# Patient Record
Sex: FEMALE | Race: OTHER | URBAN - METROPOLITAN AREA
[De-identification: names, ages, dates, MRNs, and addresses within clinical notes are randomized per-mention and may not be internally consistent; named-entity substitution may affect disease eponyms.]

---

## 2022-11-28 LAB
AMB EXT CHLAMYDIA, NUCLEIC ACID AMP: NEGATIVE
AMB EXT GONOCOCCUS, NUCLEIC ACID AMP: NEGATIVE
AMB EXT HPV: NEGATIVE
AMB EXT THINPREP PAP: NEGATIVE

## 2022-12-23 LAB
AMB EXT ANTIBODY SCREEN: NEGATIVE
AMB EXT GLUCOSE 1HR OB: 179
AMB EXT HBSAG SCREEN: NEGATIVE
AMB EXT HEMATOCRIT: 40.4
AMB EXT HEMOGLOBIN: 13.3
AMB EXT MCV: 84
AMB EXT PLATELETS: 210
AMB EXT RH FACTOR: POSITIVE
AMB EXT SICKLE CELL SOLUBILITY: NEGATIVE
AMB EXT TSH: 0.64 MIU/ML

## 2022-12-27 LAB
AMB EXT 1 HR GLUCOSE GESTATIONAL: 140
AMB EXT 2 HR GLUCOSE GESTATIONAL: 117
AMB EXT 3 HR GLUCOSE GESTATIONAL: 102
AMB EXT FASTING GLUCOSE GESTATIONAL: 80
AMB EXT FETAL ANEUPLOIDY TRISOMY RISK: NEGATIVE

## 2023-01-24 LAB — AMB EXT AFP TUMOR MARKER: NEGATIVE

## 2023-02-10 ENCOUNTER — TELEPHONE (OUTPATIENT)
Dept: OBGYN CLINIC | Facility: CLINIC | Age: 38
End: 2023-02-10

## 2023-02-10 NOTE — TELEPHONE ENCOUNTER
Need to confirm how far along pt is. If he is less than 20 weeks pt can be scheduled with any of the providers.      LMTCB

## 2023-02-10 NOTE — TELEPHONE ENCOUNTER
Patient is almost 18 weeks along and would like to transfer her care to this office. Records are being faxed. Please call once reviewed.

## 2023-02-10 NOTE — TELEPHONE ENCOUNTER
Pt Name and  verified. Pt is 18 wks. Has requested her records. Advised to try and obtain and bring with for day of apt. Pt scheduled with AYAD.

## 2023-02-13 ENCOUNTER — OFFICE VISIT (OUTPATIENT)
Dept: OBGYN CLINIC | Facility: CLINIC | Age: 38
End: 2023-02-13

## 2023-02-13 ENCOUNTER — TELEPHONE (OUTPATIENT)
Dept: PERINATAL CARE | Facility: HOSPITAL | Age: 38
End: 2023-02-13

## 2023-02-13 VITALS — WEIGHT: 156.63 LBS | DIASTOLIC BLOOD PRESSURE: 64 MMHG | SYSTOLIC BLOOD PRESSURE: 102 MMHG

## 2023-02-13 DIAGNOSIS — O09.522 MULTIGRAVIDA OF ADVANCED MATERNAL AGE IN SECOND TRIMESTER: ICD-10-CM

## 2023-02-13 DIAGNOSIS — N91.2 AMENORRHEA: ICD-10-CM

## 2023-02-13 DIAGNOSIS — N92.6 MISSED MENSES: Primary | ICD-10-CM

## 2023-02-13 LAB
CONTROL LINE PRESENT WITH A CLEAR BACKGROUND (YES/NO): YES YES/NO
PREGNANCY TEST, URINE: POSITIVE

## 2023-02-13 RX ORDER — METOCLOPRAMIDE 5 MG/1
5 TABLET ORAL 4 TIMES DAILY
COMMUNITY
Start: 2022-12-02 | End: 2023-02-13

## 2023-02-13 RX ORDER — MULTIVIT,IRON,MINERALS/LUTEIN
TABLET ORAL
COMMUNITY

## 2023-02-13 RX ORDER — METOCLOPRAMIDE 5 MG/1
5 TABLET ORAL
COMMUNITY
Start: 2022-11-28 | End: 2023-02-26

## 2023-02-13 RX ORDER — ASPIRIN 81 MG/1
81 TABLET, CHEWABLE ORAL DAILY
COMMUNITY
Start: 2022-12-27 | End: 2023-06-25

## 2023-02-13 NOTE — TELEPHONE ENCOUNTER
Returned PTs call to schedule w/MFM. Called her using the  line and left a voicemail to call us back.

## 2023-02-14 ENCOUNTER — TELEPHONE (OUTPATIENT)
Dept: OBGYN CLINIC | Facility: CLINIC | Age: 38
End: 2023-02-14

## 2023-02-14 ENCOUNTER — TELEPHONE (OUTPATIENT)
Dept: PERINATAL CARE | Facility: HOSPITAL | Age: 38
End: 2023-02-14

## 2023-02-14 NOTE — TELEPHONE ENCOUNTER
Dr. Miller Hope told Pt to schedule ultrasound as soon as possible. The soonest appointment was not until 3/14/23. Will that be okay? Needs . Please call.

## 2023-02-15 NOTE — TELEPHONE ENCOUNTER
Patient name and  verified. Advised patient to keep scheduled appt. Encouraged to contact [facility] office for possible cancellations. Verbalized understanding.

## 2023-02-16 NOTE — TELEPHONE ENCOUNTER
Your case has been Approved. Provider Name: DR. Justino Gonzalez Contact: [facility]  Provider Address: [address]  [place], [place] Phone Number: [phone]   Fax Number: [phone]  Patient Name: Susanna Taylor Patient Id: [id number]  Insurance Carrier: [facility]  Site Name: Sean Dyer Site ID: [id number]  Site Address: [address] [place] [address]  [place], [place]      Primary Diagnosis Code: O09.522 Description: Supervision of elderly multigravida, second trimester  Secondary Diagnosis Code:  Description:   Date of Service: Not provided    CPT Code: 40478 Description: OB US, DETAILED, [facility] Number: [id number]  Review Date: 2/16/2023 4:50:00 PM  Expiration Date: 11/13/2023  Status: Your case has been Approved.

## 2023-02-28 ENCOUNTER — NURSE ONLY (OUTPATIENT)
Dept: OBGYN CLINIC | Facility: CLINIC | Age: 38
End: 2023-02-28

## 2023-02-28 ENCOUNTER — TELEPHONE (OUTPATIENT)
Dept: CASE MANAGEMENT | Facility: HOSPITAL | Age: 38
End: 2023-02-28

## 2023-02-28 VITALS — BODY MASS INDEX: 24 KG/M2 | HEIGHT: 68 IN

## 2023-02-28 PROCEDURE — 99211 OFF/OP EST MAY X REQ PHY/QHP: CPT | Performed by: OBSTETRICS & GYNECOLOGY

## 2023-02-28 NOTE — PROGRESS NOTES
Patient seen in office today for Ob education visit. Missed menses apt with: AYAD  LMP: 10/3/2022    Pre  BMI: 24.33  EPDS score: 3/30  +UPT at home:  10/29/22  +UPT in office: N/a pt is a FABIANA    US: [facility] at [facility]  Working LETTY: 7/10/23 by LMP  Hx of genetic abnormality in family: Denies  Hx of varicella: had chicken pox  Flu Vaccine: not received   Covid Vaccine: Covid vaccine series completed      Consent (if needed): n/a      Sterilization/Contraception: desires BC but unsure of method     OUD Screening: Pt. Has answered NO 5P questions and has NO  risk factors. Pt. Given What pregnant women need to know handout. Educational material reviewed with patient: Prenatal care, nutrition, weight gain recommendations, travel, exercise, intercourse, pregnancy changes, safe medications, pregnancy and work, fetal movement, labor and  labor, warning signs, food safety, tdap, cord blood, breastfeeding-breast, circumcision-having a girl, and Group B strep. Blood transfusion if needed: yes  PN labs: see results console     Optional genetic screening labs were reviewed: Chirag Crespo, FTS with US, Quad screen MSAFP and CF screening. Had Uawnaqin64 genetic testing and was normal. Having a girl.   CF screen-normal  SMN-normal     [employer] Policy: informed     NOB apt: scheduled 3/10/2023 with AYAD

## 2023-03-10 ENCOUNTER — INITIAL PRENATAL (OUTPATIENT)
Dept: OBGYN CLINIC | Facility: CLINIC | Age: 38
End: 2023-03-10

## 2023-03-10 VITALS — WEIGHT: 160 LBS | SYSTOLIC BLOOD PRESSURE: 105 MMHG | BODY MASS INDEX: 24 KG/M2 | DIASTOLIC BLOOD PRESSURE: 64 MMHG

## 2023-03-10 DIAGNOSIS — Z34.82 ENCOUNTER FOR SUPERVISION OF OTHER NORMAL PREGNANCY IN SECOND TRIMESTER: Primary | ICD-10-CM

## 2023-03-10 DIAGNOSIS — O09.522 AMA (ADVANCED MATERNAL AGE) MULTIGRAVIDA 35+, SECOND TRIMESTER: ICD-10-CM

## 2023-03-10 PROCEDURE — 3078F DIAST BP <80 MM HG: CPT | Performed by: OBSTETRICS & GYNECOLOGY

## 2023-03-10 PROCEDURE — 3074F SYST BP LT 130 MM HG: CPT | Performed by: OBSTETRICS & GYNECOLOGY

## 2023-03-10 PROCEDURE — 0500F INITIAL PRENATAL CARE VISIT: CPT | Performed by: OBSTETRICS & GYNECOLOGY

## 2023-03-14 ENCOUNTER — HOSPITAL ENCOUNTER (OUTPATIENT)
Dept: PERINATAL CARE | Facility: HOSPITAL | Age: 38
Discharge: HOME | End: 2023-03-14
Attending: OBSTETRICS & GYNECOLOGY
Payer: MEDICAID

## 2023-03-14 ENCOUNTER — TELEPHONE (OUTPATIENT)
Dept: PERINATAL CARE | Facility: HOSPITAL | Age: 38
End: 2023-03-14

## 2023-03-14 DIAGNOSIS — Z36.3 ENCOUNTER FOR ANTENATAL SCREENING FOR MALFORMATION USING ULTRASOUND: Primary | ICD-10-CM

## 2023-03-14 DIAGNOSIS — O09.512 PRIMIGRAVIDA OF ADVANCED MATERNAL AGE IN SECOND TRIMESTER: ICD-10-CM

## 2023-03-14 DIAGNOSIS — Z36.3 ENCOUNTER FOR ANTENATAL SCREENING FOR MALFORMATION USING ULTRASOUND: ICD-10-CM

## 2023-03-14 PROBLEM — O09.519 AMA (ADVANCED MATERNAL AGE) PRIMIGRAVIDA 35+: Status: ACTIVE | Noted: 2023-03-14

## 2023-03-14 PROCEDURE — 76811 OB US DETAILED SNGL FETUS: CPT | Performed by: OBSTETRICS & GYNECOLOGY

## 2023-03-15 NOTE — PROGRESS NOTES
Good fm. Pregnancy counseling and all questions answered. AMA. Counseled. Has mfm appt 3/14/23 for DUSTY.

## 2023-04-04 ENCOUNTER — ROUTINE PRENATAL (OUTPATIENT)
Dept: OBGYN CLINIC | Facility: CLINIC | Age: 38
End: 2023-04-04

## 2023-04-04 VITALS — WEIGHT: 167 LBS | DIASTOLIC BLOOD PRESSURE: 63 MMHG | BODY MASS INDEX: 25 KG/M2 | SYSTOLIC BLOOD PRESSURE: 102 MMHG

## 2023-04-04 DIAGNOSIS — Z34.82 ENCOUNTER FOR SUPERVISION OF OTHER NORMAL PREGNANCY IN SECOND TRIMESTER: Primary | ICD-10-CM

## 2023-04-04 LAB
APPEARANCE: CLEAR
BILIRUBIN: NEGATIVE
GLUCOSE (URINE DIPSTICK): NEGATIVE MG/DL
KETONES (URINE DIPSTICK): NEGATIVE MG/DL
MULTISTIX LOT#: ABNORMAL NUMERIC
NITRITE, URINE: NEGATIVE
OCCULT BLOOD: NEGATIVE
PH, URINE: 6 (ref 4.5–8)
PROTEIN (URINE DIPSTICK): NEGATIVE MG/DL
SPECIFIC GRAVITY: 1.03 (ref 1–1.03)
URINE-COLOR: YELLOW
UROBILINOGEN,SEMI-QN: 0.2 MG/DL (ref 0–1.9)

## 2023-04-04 PROCEDURE — 3078F DIAST BP <80 MM HG: CPT | Performed by: OBSTETRICS & GYNECOLOGY

## 2023-04-04 PROCEDURE — 3074F SYST BP LT 130 MM HG: CPT | Performed by: OBSTETRICS & GYNECOLOGY

## 2023-04-04 PROCEDURE — 81002 URINALYSIS NONAUTO W/O SCOPE: CPT | Performed by: OBSTETRICS & GYNECOLOGY

## 2023-04-04 PROCEDURE — 0502F SUBSEQUENT PRENATAL CARE: CPT | Performed by: OBSTETRICS & GYNECOLOGY

## 2023-04-10 LAB
APPEARANCE: CLEAR
BILIRUBIN: NEGATIVE
GLUCOSE (URINE DIPSTICK): NEGATIVE MG/DL
KETONES (URINE DIPSTICK): NEGATIVE MG/DL
LEUKOCYTES: NEGATIVE
MULTISTIX LOT#: 4023 NUMERIC
NITRITE, URINE: NEGATIVE
OCCULT BLOOD: NEGATIVE
PH, URINE: 7 (ref 4.5–8)
PROTEIN (URINE DIPSTICK): NEGATIVE MG/DL
SPECIFIC GRAVITY: 1.02 (ref 1–1.03)
URINE-COLOR: YELLOW
UROBILINOGEN,SEMI-QN: 0.2 MG/DL (ref 0–1.9)

## 2023-04-13 ENCOUNTER — LAB ENCOUNTER (OUTPATIENT)
Dept: LAB | Facility: REFERENCE LAB | Age: 38
End: 2023-04-13
Attending: OBSTETRICS & GYNECOLOGY
Payer: MEDICAID

## 2023-04-13 DIAGNOSIS — Z34.82 ENCOUNTER FOR SUPERVISION OF OTHER NORMAL PREGNANCY IN SECOND TRIMESTER: ICD-10-CM

## 2023-04-13 LAB
BASOPHILS # BLD AUTO: 0.04 X10(3) UL (ref 0–0.2)
BASOPHILS NFR BLD AUTO: 0.4 %
DEPRECATED HBV CORE AB SER IA-ACNC: 7.1 NG/ML
DEPRECATED RDW RBC AUTO: 44.5 FL (ref 35.1–46.3)
EOSINOPHIL # BLD AUTO: 0.13 X10(3) UL (ref 0–0.7)
EOSINOPHIL NFR BLD AUTO: 1.2 %
ERYTHROCYTE [DISTWIDTH] IN BLOOD BY AUTOMATED COUNT: 13.9 % (ref 11–15)
GLUCOSE 1H P GLC SERPL-MCNC: 193 MG/DL
HCT VFR BLD AUTO: 35.7 %
HGB BLD-MCNC: 11.4 G/DL
IMM GRANULOCYTES # BLD AUTO: 0.08 X10(3) UL (ref 0–1)
IMM GRANULOCYTES NFR BLD: 0.7 %
LYMPHOCYTES # BLD AUTO: 1.59 X10(3) UL (ref 1–4)
LYMPHOCYTES NFR BLD AUTO: 14.6 %
MCH RBC QN AUTO: 28.1 PG (ref 26–34)
MCHC RBC AUTO-ENTMCNC: 31.9 G/DL (ref 31–37)
MCV RBC AUTO: 87.9 FL
MONOCYTES # BLD AUTO: 0.57 X10(3) UL (ref 0.1–1)
MONOCYTES NFR BLD AUTO: 5.2 %
NEUTROPHILS # BLD AUTO: 8.48 X10 (3) UL (ref 1.5–7.7)
NEUTROPHILS # BLD AUTO: 8.48 X10(3) UL (ref 1.5–7.7)
NEUTROPHILS NFR BLD AUTO: 77.9 %
PLATELET # BLD AUTO: 211 10(3)UL (ref 150–450)
RBC # BLD AUTO: 4.06 X10(6)UL
WBC # BLD AUTO: 10.9 X10(3) UL (ref 4–11)

## 2023-04-13 PROCEDURE — 82950 GLUCOSE TEST: CPT

## 2023-04-13 PROCEDURE — 36415 COLL VENOUS BLD VENIPUNCTURE: CPT

## 2023-04-13 PROCEDURE — 85025 COMPLETE CBC W/AUTO DIFF WBC: CPT

## 2023-04-13 PROCEDURE — 82728 ASSAY OF FERRITIN: CPT

## 2023-04-18 ENCOUNTER — ROUTINE PRENATAL (OUTPATIENT)
Dept: OBGYN CLINIC | Facility: CLINIC | Age: 38
End: 2023-04-18

## 2023-04-18 VITALS — WEIGHT: 170 LBS | BODY MASS INDEX: 26 KG/M2 | DIASTOLIC BLOOD PRESSURE: 62 MMHG | SYSTOLIC BLOOD PRESSURE: 104 MMHG

## 2023-04-18 DIAGNOSIS — R73.09 ABNORMAL GLUCOSE TOLERANCE TEST: ICD-10-CM

## 2023-04-18 DIAGNOSIS — Z34.82 ENCOUNTER FOR SUPERVISION OF OTHER NORMAL PREGNANCY IN SECOND TRIMESTER: Primary | ICD-10-CM

## 2023-04-18 LAB
APPEARANCE: CLEAR
BILIRUBIN: NEGATIVE
GLUCOSE (URINE DIPSTICK): NEGATIVE MG/DL
KETONES (URINE DIPSTICK): NEGATIVE MG/DL
LEUKOCYTES: NEGATIVE
MULTISTIX LOT#: NORMAL NUMERIC
NITRITE, URINE: NEGATIVE
OCCULT BLOOD: NEGATIVE
PH, URINE: 5 (ref 4.5–8)
PROTEIN (URINE DIPSTICK): NEGATIVE MG/DL
SPECIFIC GRAVITY: 1 (ref 1–1.03)
URINE-COLOR: YELLOW
UROBILINOGEN,SEMI-QN: 0.2 MG/DL (ref 0–1.9)

## 2023-04-18 PROCEDURE — 3078F DIAST BP <80 MM HG: CPT | Performed by: OBSTETRICS & GYNECOLOGY

## 2023-04-18 PROCEDURE — 81002 URINALYSIS NONAUTO W/O SCOPE: CPT | Performed by: OBSTETRICS & GYNECOLOGY

## 2023-04-18 PROCEDURE — 0502F SUBSEQUENT PRENATAL CARE: CPT | Performed by: OBSTETRICS & GYNECOLOGY

## 2023-04-18 PROCEDURE — 90715 TDAP VACCINE 7 YRS/> IM: CPT | Performed by: OBSTETRICS & GYNECOLOGY

## 2023-04-18 PROCEDURE — 90471 IMMUNIZATION ADMIN: CPT | Performed by: OBSTETRICS & GYNECOLOGY

## 2023-04-18 PROCEDURE — 3074F SYST BP LT 130 MM HG: CPT | Performed by: OBSTETRICS & GYNECOLOGY

## 2023-04-18 NOTE — PROGRESS NOTES
No C/Os. Informed of abnormal 1 hour GTT. To do 3 hour GTT ASAP. Ferritin low. To start Iron every other day in addition to her daily PNV. Discussed the benefits of Tdap. Patient desires vaccine today.

## 2023-04-19 ENCOUNTER — TELEPHONE (OUTPATIENT)
Dept: OBGYN CLINIC | Facility: CLINIC | Age: 38
End: 2023-04-19

## 2023-04-19 NOTE — TELEPHONE ENCOUNTER
----- Message from Cr Booker MD sent at 4/18/2023  7:21 PM CDT -----  Elevated 1 hr gtt, pt needs 3 hr gtt asap,  please call pt and help pt make this appt. Pt also needs to take FE daily, and repeat testing for ferritin scheduled.

## 2023-04-19 NOTE — TELEPHONE ENCOUNTER
#[id number]    Pt informed of results and recommendations. Pt states that she had an appointment with MLM yesterday and results/recommnedations were discussed at that time. Pt already has 3 hr gtt scheduled.

## 2023-04-20 ENCOUNTER — TELEPHONE (OUTPATIENT)
Dept: OBGYN CLINIC | Facility: CLINIC | Age: 38
End: 2023-04-20

## 2023-04-20 ENCOUNTER — LAB ENCOUNTER (OUTPATIENT)
Dept: LAB | Facility: REFERENCE LAB | Age: 38
End: 2023-04-20
Attending: OBSTETRICS & GYNECOLOGY
Payer: MEDICAID

## 2023-04-20 DIAGNOSIS — O24.410 DIET CONTROLLED GESTATIONAL DIABETES MELLITUS (GDM) IN THIRD TRIMESTER: ICD-10-CM

## 2023-04-20 DIAGNOSIS — O24.419 GESTATIONAL DIABETES MELLITUS (GDM), ANTEPARTUM, GESTATIONAL DIABETES METHOD OF CONTROL UNSPECIFIED: Primary | ICD-10-CM

## 2023-04-20 DIAGNOSIS — R73.09 ABNORMAL GLUCOSE TOLERANCE TEST: ICD-10-CM

## 2023-04-20 LAB
GLUCOSE 1H P GLC SERPL-MCNC: 221 MG/DL
GLUCOSE 2H P GLC SERPL-MCNC: 170 MG/DL
GLUCOSE 3H P GLC SERPL-MCNC: 108 MG/DL (ref 70–140)
GLUCOSE P FAST SERPL-MCNC: 85 MG/DL

## 2023-04-20 PROCEDURE — 82952 GTT-ADDED SAMPLES: CPT

## 2023-04-20 PROCEDURE — 36415 COLL VENOUS BLD VENIPUNCTURE: CPT

## 2023-04-20 PROCEDURE — 82951 GLUCOSE TOLERANCE TEST (GTT): CPT

## 2023-04-20 NOTE — TELEPHONE ENCOUNTER
Pt Name and  verified. Patient informed and verbalized understanding. Orders placed for DM center. No further questions.

## 2023-04-28 ENCOUNTER — HOSPITAL ENCOUNTER (OUTPATIENT)
Dept: ENDOCRINOLOGY | Facility: HOSPITAL | Age: 38
Discharge: HOME | End: 2023-04-28
Attending: OBSTETRICS & GYNECOLOGY
Payer: MEDICAID

## 2023-04-28 VITALS — WEIGHT: 171 LBS | BODY MASS INDEX: 26 KG/M2

## 2023-04-28 DIAGNOSIS — O24.419 GESTATIONAL DIABETES MELLITUS (GDM), ANTEPARTUM, GESTATIONAL DIABETES METHOD OF CONTROL UNSPECIFIED: Primary | ICD-10-CM

## 2023-04-28 RX ORDER — LANCETS 33 GAUGE
1 EACH MISCELLANEOUS 4 TIMES DAILY
Qty: 200 EACH | Refills: 2 | Status: SHIPPED | OUTPATIENT
Start: 2023-04-28 | End: 2024-04-27

## 2023-04-28 RX ORDER — BLOOD SUGAR DIAGNOSTIC
1 STRIP MISCELLANEOUS 4 TIMES DAILY
Qty: 100 STRIP | Refills: 1 | Status: SHIPPED | OUTPATIENT
Start: 2023-04-28

## 2023-04-28 RX ORDER — BLOOD-GLUCOSE METER
1 EACH MISCELLANEOUS DAILY
Qty: 1 KIT | Refills: 0 | Status: SHIPPED | OUTPATIENT
Start: 2023-04-28

## 2023-04-28 RX ORDER — ASPIRIN 81 MG/1
81 TABLET, CHEWABLE ORAL DAILY
Qty: 30 TABLET | Refills: 5 | OUTPATIENT
Start: 2023-04-28 | End: 2023-10-25

## 2023-04-28 NOTE — PROGRESS NOTES
Masha Rondon  : 1985 was seen for Gestational Diabetes Counseling: Individual/Group    Date: 2023   Start time: 9:15am End time: 10:15am    Obtained usual diet history: Met with patient for gestational diabetes education. Patient states this is her 1st pregnancy and never had any diabetes related education. Patient eats 3 home made meals a day with 1-2 snacks. Patient  Eats out once a month and does not eat sweets. Patient verbalized that her concern is related to her tortilla consumption. She reports eating 5-6 tortillas per meal especially for lunch and dinner. Breakfast is lighter consisting of a fruit and vegetable juice and a slice of toast.    Education:     GDM Overview:  Reviewed gestational diabetes as diagnosis including target blood glucose values. Benefits, risks, and management options for improving/maintaining glucose control to mother/baby discussed. Instructed /demonstrated ability to perform blood glucose testing on: onetouch meter    Healthy Eating:  Discussed nutrition concepts for pregnancy/healthy eating and effects of food on BG value. Timing of meals; what is a carbohydrate, protein, fat. Taught: Carb counting, label reading, meal planning. Suggested Calorie Level: 2000    Being Active:  Benefits and effects of activity on BG discussed. Monitoring:  Instructed on how to use glucose monitor/proper lancet disposal. Testing schedules are:   Fastin-95 mg/dL, Call MD if >95 mg/dL twice in 1 week   2 Hour Post Prandial:  120 md/dL, Call MD if >120 mg/dL twice in 1 week. Taking Medication:  Reviewed when medication might be indicated. Reducing Risk:  Effects of elevated blood glucose on mother/baby reviewed. Discussed management (hyperglycemia, hypoglycemia, sick day, DKA, other) and when to call provider. Post pregnancy management/prevention of Type 2 DM, and increased risk of having diabetes later in life reviewed.     Healthy Coping:  Family involvement/social support encouraged. Identification of lifestyle behaviors willing to change discussed. Training Tools Provided:  Printed materials covering each topic provided. Support Plan provided and reviewed. Patient Chosen Goals:      1. Follow recommended GDM meal plan. 2. Begin testing fasting glucose and 2 hours after meals   3. Bring glucose log to each MD office visit. 4. Encouraged activity if no restrictions. 5. Encouraged Glenn Golden to call diabetes center with any questions or concerns. Patient verbalized understanding and has no further questions at this time.     Nader Culver RN

## 2023-05-01 RX ORDER — ASPIRIN 81 MG/1
81 TABLET, CHEWABLE ORAL DAILY
Qty: 30 TABLET | Refills: 5 | Status: SHIPPED | OUTPATIENT
Start: 2023-05-01 | End: 2023-10-28

## 2023-05-02 ENCOUNTER — ROUTINE PRENATAL (OUTPATIENT)
Dept: OBGYN CLINIC | Facility: CLINIC | Age: 38
End: 2023-05-02

## 2023-05-02 VITALS — BODY MASS INDEX: 26 KG/M2 | WEIGHT: 170.19 LBS

## 2023-05-02 DIAGNOSIS — Z34.83 ENCOUNTER FOR SUPERVISION OF OTHER NORMAL PREGNANCY IN THIRD TRIMESTER: Primary | ICD-10-CM

## 2023-05-02 DIAGNOSIS — O09.519 ADVANCED MATERNAL AGE, PRIMIGRAVIDA, ANTEPARTUM: ICD-10-CM

## 2023-05-02 DIAGNOSIS — O24.419 GESTATIONAL DIABETES MELLITUS (GDM), ANTEPARTUM, GESTATIONAL DIABETES METHOD OF CONTROL UNSPECIFIED: ICD-10-CM

## 2023-05-02 LAB
BILIRUBIN: NEGATIVE
GLUCOSE (URINE DIPSTICK): NEGATIVE MG/DL
KETONES (URINE DIPSTICK): NEGATIVE MG/DL
MULTISTIX LOT#: ABNORMAL NUMERIC
NITRITE, URINE: NEGATIVE
OCCULT BLOOD: NEGATIVE
PH, URINE: 6 (ref 4.5–8)
PROTEIN (URINE DIPSTICK): NEGATIVE MG/DL
SPECIFIC GRAVITY: 1.01 (ref 1–1.03)
URINE-COLOR: YELLOW
UROBILINOGEN,SEMI-QN: 0.2 MG/DL (ref 0–1.9)

## 2023-05-02 PROCEDURE — 0502F SUBSEQUENT PRENATAL CARE: CPT | Performed by: OBSTETRICS & GYNECOLOGY

## 2023-05-02 PROCEDURE — 81002 URINALYSIS NONAUTO W/O SCOPE: CPT | Performed by: OBSTETRICS & GYNECOLOGY

## 2023-05-16 ENCOUNTER — TELEPHONE (OUTPATIENT)
Dept: PERINATAL CARE | Facility: HOSPITAL | Age: 38
End: 2023-05-16

## 2023-05-16 ENCOUNTER — HOSPITAL ENCOUNTER (OUTPATIENT)
Dept: PERINATAL CARE | Facility: HOSPITAL | Age: 38
Discharge: HOME | End: 2023-05-16
Attending: OBSTETRICS & GYNECOLOGY
Payer: MEDICAID

## 2023-05-16 VITALS
HEART RATE: 93 BPM | SYSTOLIC BLOOD PRESSURE: 113 MMHG | DIASTOLIC BLOOD PRESSURE: 65 MMHG | WEIGHT: 170 LBS | BODY MASS INDEX: 26 KG/M2

## 2023-05-16 DIAGNOSIS — O09.513 PRIMIGRAVIDA OF ADVANCED MATERNAL AGE IN THIRD TRIMESTER: ICD-10-CM

## 2023-05-16 DIAGNOSIS — O24.410 DIET CONTROLLED GESTATIONAL DIABETES MELLITUS (GDM), ANTEPARTUM: ICD-10-CM

## 2023-05-16 DIAGNOSIS — O09.513 PRIMIGRAVIDA OF ADVANCED MATERNAL AGE IN THIRD TRIMESTER: Primary | ICD-10-CM

## 2023-05-16 DIAGNOSIS — O24.414 INSULIN CONTROLLED GESTATIONAL DIABETES MELLITUS (GDM) DURING PREGNANCY, ANTEPARTUM: ICD-10-CM

## 2023-05-16 PROCEDURE — 76819 FETAL BIOPHYS PROFIL W/O NST: CPT

## 2023-05-16 PROCEDURE — 76816 OB US FOLLOW-UP PER FETUS: CPT | Performed by: OBSTETRICS & GYNECOLOGY

## 2023-05-16 NOTE — PROGRESS NOTES
Addended by: JEZ PRICE on: 12/28/2022 08:19 AM     Modules accepted: Orders     Pt for Growth US    Denies pregnancy complaints  States active fetus

## 2023-05-17 ENCOUNTER — ROUTINE PRENATAL (OUTPATIENT)
Dept: OBGYN CLINIC | Facility: CLINIC | Age: 38
End: 2023-05-17

## 2023-05-17 VITALS — DIASTOLIC BLOOD PRESSURE: 71 MMHG | SYSTOLIC BLOOD PRESSURE: 111 MMHG | BODY MASS INDEX: 26 KG/M2 | WEIGHT: 171 LBS

## 2023-05-17 DIAGNOSIS — O24.410 DIET CONTROLLED GESTATIONAL DIABETES MELLITUS (GDM), ANTEPARTUM: ICD-10-CM

## 2023-05-17 DIAGNOSIS — Z34.83 ENCOUNTER FOR SUPERVISION OF OTHER NORMAL PREGNANCY IN THIRD TRIMESTER: ICD-10-CM

## 2023-05-17 DIAGNOSIS — O09.519 ADVANCED MATERNAL AGE, PRIMIGRAVIDA, ANTEPARTUM: Primary | ICD-10-CM

## 2023-05-17 DIAGNOSIS — B37.31 VULVOVAGINITIS DUE TO YEAST: ICD-10-CM

## 2023-05-17 PROCEDURE — 99213 OFFICE O/P EST LOW 20 MIN: CPT | Performed by: OBSTETRICS & GYNECOLOGY

## 2023-05-17 PROCEDURE — 0502F SUBSEQUENT PRENATAL CARE: CPT | Performed by: OBSTETRICS & GYNECOLOGY

## 2023-05-17 PROCEDURE — 3078F DIAST BP <80 MM HG: CPT | Performed by: OBSTETRICS & GYNECOLOGY

## 2023-05-17 PROCEDURE — 3074F SYST BP LT 130 MM HG: CPT | Performed by: OBSTETRICS & GYNECOLOGY

## 2023-05-18 ENCOUNTER — HOSPITAL ENCOUNTER (OUTPATIENT)
Dept: ENDOCRINOLOGY | Facility: HOSPITAL | Age: 38
Discharge: HOME | End: 2023-05-18
Attending: OBSTETRICS & GYNECOLOGY
Payer: MEDICAID

## 2023-05-18 VITALS — BODY MASS INDEX: 26 KG/M2 | WEIGHT: 172.19 LBS

## 2023-05-18 DIAGNOSIS — O24.419 GESTATIONAL DIABETES MELLITUS (GDM), ANTEPARTUM, GESTATIONAL DIABETES METHOD OF CONTROL UNSPECIFIED: Primary | ICD-10-CM

## 2023-05-18 NOTE — TELEPHONE ENCOUNTER
CPT Code: 76860 Description: Fetal biophys profil w/o nst  Authorization Number: [id number]    CPT Code: 51299 Description: Ob us, follow-up, per fetus  Authorization Number: [id number]

## 2023-05-22 ENCOUNTER — TELEPHONE (OUTPATIENT)
Dept: OBGYN CLINIC | Facility: CLINIC | Age: 38
End: 2023-05-22

## 2023-05-22 RX ORDER — BLOOD SUGAR DIAGNOSTIC
STRIP MISCELLANEOUS
Qty: 100 STRIP | Refills: 0 | Status: SHIPPED | OUTPATIENT
Start: 2023-05-22

## 2023-05-22 NOTE — TELEPHONE ENCOUNTER
One touch verio test st(new)100s     Sig: test in the morning at noon in the evening and at bedtime as directed     Message: plan does not cover this medication. Please call plan at [phone] to initiated prior authorization or call/fax pharmacy to change medication.  Patient ID is [id number]

## 2023-05-22 NOTE — TELEPHONE ENCOUNTER
Pt said BRENNAN sent a cream for infection on 5/17, over to [facility].  [facility] is out of stock needs a different RX

## 2023-05-23 NOTE — TELEPHONE ENCOUNTER
I spoke with patient on the phone and discussed the option of sending the Terazol 3 to another pharmacy versus patient using over-the-counter Monistat. She elects to use over-the-counter Monistat. She will contact the office if she has any problems or concerns.

## 2023-05-30 ENCOUNTER — HOSPITAL ENCOUNTER (OUTPATIENT)
Facility: HOSPITAL | Age: 38
Discharge: HOME | End: 2023-05-30
Attending: OBSTETRICS & GYNECOLOGY | Admitting: OBSTETRICS & GYNECOLOGY
Payer: MEDICAID

## 2023-05-30 ENCOUNTER — NST DOCUMENTATION (OUTPATIENT)
Dept: OBGYN UNIT | Facility: HOSPITAL | Age: 38
End: 2023-05-30

## 2023-05-30 ENCOUNTER — APPOINTMENT (OUTPATIENT)
Dept: OBGYN CLINIC | Facility: HOSPITAL | Age: 38
End: 2023-05-30
Payer: MEDICAID

## 2023-05-30 VITALS — HEART RATE: 84 BPM | DIASTOLIC BLOOD PRESSURE: 64 MMHG | SYSTOLIC BLOOD PRESSURE: 115 MMHG

## 2023-05-30 DIAGNOSIS — O09.529 AMA (ADVANCED MATERNAL AGE) MULTIGRAVIDA 35+: ICD-10-CM

## 2023-05-30 PROCEDURE — 59025 FETAL NON-STRESS TEST: CPT | Performed by: OBSTETRICS & GYNECOLOGY

## 2023-05-30 PROCEDURE — 59025 FETAL NON-STRESS TEST: CPT

## 2023-05-30 NOTE — NST
Nonstress Test   Patient: Jennifer Walsh    Gestation: 34w1d    Diagnosis from order:         NST:        2023   NST DOCUMENTATION   Variability 6-25 BPM   Accelerations Yes   Decelerations None   Baseline 135 BPM   Uterine Irritability No   Contractions Not present   Acoustic Stimulator No   Nonstress Test Interpretation Reactive   Nonstress Test Second Interpretation Reactive   NST Completed by Prudence Galindo RN   Disposition Home    Testing Plan Weekly NST   Provider Notified MD Alec Almonte MD       Multiple values from one day are sorted in reverse-chronological order         I agree with the above evaluation. NST completed.   Desiree Stephenson MD  2023  2:21 PM

## 2023-06-02 ENCOUNTER — ROUTINE PRENATAL (OUTPATIENT)
Dept: OBGYN CLINIC | Facility: CLINIC | Age: 38
End: 2023-06-02

## 2023-06-02 VITALS — WEIGHT: 173 LBS | SYSTOLIC BLOOD PRESSURE: 102 MMHG | BODY MASS INDEX: 26 KG/M2 | DIASTOLIC BLOOD PRESSURE: 64 MMHG

## 2023-06-02 DIAGNOSIS — Z34.83 ENCOUNTER FOR SUPERVISION OF OTHER NORMAL PREGNANCY IN THIRD TRIMESTER: Primary | ICD-10-CM

## 2023-06-02 LAB
APPEARANCE: CLEAR
BILIRUBIN: NEGATIVE
GLUCOSE (URINE DIPSTICK): NEGATIVE MG/DL
KETONES (URINE DIPSTICK): NEGATIVE MG/DL
MULTISTIX LOT#: ABNORMAL NUMERIC
NITRITE, URINE: NEGATIVE
PH, URINE: 6.5 (ref 4.5–8)
PROTEIN (URINE DIPSTICK): NEGATIVE MG/DL
SPECIFIC GRAVITY: 1.01 (ref 1–1.03)
URINE-COLOR: YELLOW
UROBILINOGEN,SEMI-QN: 0.2 MG/DL (ref 0–1.9)

## 2023-06-02 PROCEDURE — 0502F SUBSEQUENT PRENATAL CARE: CPT | Performed by: OBSTETRICS & GYNECOLOGY

## 2023-06-02 PROCEDURE — 3078F DIAST BP <80 MM HG: CPT | Performed by: OBSTETRICS & GYNECOLOGY

## 2023-06-02 PROCEDURE — 3074F SYST BP LT 130 MM HG: CPT | Performed by: OBSTETRICS & GYNECOLOGY

## 2023-06-02 PROCEDURE — 81002 URINALYSIS NONAUTO W/O SCOPE: CPT | Performed by: OBSTETRICS & GYNECOLOGY

## 2023-06-06 ENCOUNTER — HOSPITAL ENCOUNTER (OUTPATIENT)
Facility: HOSPITAL | Age: 38
Discharge: HOME | End: 2023-06-06
Attending: OBSTETRICS & GYNECOLOGY | Admitting: OBSTETRICS & GYNECOLOGY
Payer: MEDICAID

## 2023-06-06 ENCOUNTER — LAB ENCOUNTER (OUTPATIENT)
Dept: LAB | Facility: HOSPITAL | Age: 38
End: 2023-06-06
Attending: OBSTETRICS & GYNECOLOGY
Payer: MEDICAID

## 2023-06-06 ENCOUNTER — APPOINTMENT (OUTPATIENT)
Dept: OBGYN CLINIC | Facility: HOSPITAL | Age: 38
End: 2023-06-06
Attending: OBSTETRICS & GYNECOLOGY
Payer: MEDICAID

## 2023-06-06 ENCOUNTER — NST DOCUMENTATION (OUTPATIENT)
Dept: OBGYN CLINIC | Facility: CLINIC | Age: 38
End: 2023-06-06

## 2023-06-06 VITALS — SYSTOLIC BLOOD PRESSURE: 105 MMHG | DIASTOLIC BLOOD PRESSURE: 62 MMHG | HEART RATE: 79 BPM

## 2023-06-06 DIAGNOSIS — Z34.83 ENCOUNTER FOR SUPERVISION OF OTHER NORMAL PREGNANCY IN THIRD TRIMESTER: ICD-10-CM

## 2023-06-06 DIAGNOSIS — Z34.90 PREGNANCY: ICD-10-CM

## 2023-06-06 DIAGNOSIS — O09.513 PRIMIGRAVIDA OF ADVANCED MATERNAL AGE IN THIRD TRIMESTER: ICD-10-CM

## 2023-06-06 LAB
BASOPHILS # BLD AUTO: 0.05 X10(3) UL (ref 0–0.2)
BASOPHILS NFR BLD AUTO: 0.4 %
DEPRECATED HBV CORE AB SER IA-ACNC: 16.6 NG/ML
DEPRECATED RDW RBC AUTO: 46.5 FL (ref 35.1–46.3)
EOSINOPHIL # BLD AUTO: 0.15 X10(3) UL (ref 0–0.7)
EOSINOPHIL NFR BLD AUTO: 1.3 %
ERYTHROCYTE [DISTWIDTH] IN BLOOD BY AUTOMATED COUNT: 15 % (ref 11–15)
HCT VFR BLD AUTO: 36.6 %
HGB BLD-MCNC: 12.1 G/DL
IMM GRANULOCYTES # BLD AUTO: 0.07 X10(3) UL (ref 0–1)
IMM GRANULOCYTES NFR BLD: 0.6 %
LYMPHOCYTES # BLD AUTO: 1.61 X10(3) UL (ref 1–4)
LYMPHOCYTES NFR BLD AUTO: 13.6 %
MCH RBC QN AUTO: 28.2 PG (ref 26–34)
MCHC RBC AUTO-ENTMCNC: 33.1 G/DL (ref 31–37)
MCV RBC AUTO: 85.3 FL
MONOCYTES # BLD AUTO: 0.69 X10(3) UL (ref 0.1–1)
MONOCYTES NFR BLD AUTO: 5.8 %
NEUTROPHILS # BLD AUTO: 9.25 X10 (3) UL (ref 1.5–7.7)
NEUTROPHILS # BLD AUTO: 9.25 X10(3) UL (ref 1.5–7.7)
NEUTROPHILS NFR BLD AUTO: 78.3 %
PLATELET # BLD AUTO: 199 10(3)UL (ref 150–450)
RBC # BLD AUTO: 4.29 X10(6)UL
WBC # BLD AUTO: 11.8 X10(3) UL (ref 4–11)

## 2023-06-06 PROCEDURE — 86780 TREPONEMA PALLIDUM: CPT

## 2023-06-06 PROCEDURE — 59025 FETAL NON-STRESS TEST: CPT

## 2023-06-06 PROCEDURE — 36415 COLL VENOUS BLD VENIPUNCTURE: CPT

## 2023-06-06 PROCEDURE — 87389 HIV-1 AG W/HIV-1&-2 AB AG IA: CPT

## 2023-06-06 PROCEDURE — 82728 ASSAY OF FERRITIN: CPT

## 2023-06-06 PROCEDURE — 85025 COMPLETE CBC W/AUTO DIFF WBC: CPT

## 2023-06-06 NOTE — NST
Nonstress Test   Patient: Garrett Slaughter    Gestation: 35w1d    Diagnosis from order: Inpatient order, no diagnosis associated       NST:        2023   NST DOCUMENTATION   Variability 6-25 BPM   Accelerations Yes   Decelerations None   Baseline 135 BPM   Uterine Irritability No   Contractions Not present   Acoustic Stimulator No   Nonstress Test Interpretation Reactive   Nonstress Test Second Interpretation Reactive   FHR Category Category I    Category I   NST Completed by Darwin Hoskins RN   Disposition home    Testing Plan Weekly NST   Provider Notified Kandace BENNETT       Multiple values from one day are sorted in reverse-chronological order         I agree with the above evaluation. NST completed.   Donovan Aviles MD  2023  10:08 AM

## 2023-06-07 LAB — T PALLIDUM AB SER QL: NEGATIVE

## 2023-06-08 RX ORDER — ASPIRIN 81 MG/1
81 TABLET, CHEWABLE ORAL DAILY
Qty: 30 TABLET | Refills: 5 | Status: SHIPPED | OUTPATIENT
Start: 2023-06-08 | End: 2023-12-05

## 2023-06-13 ENCOUNTER — TELEPHONE (OUTPATIENT)
Dept: OBGYN CLINIC | Facility: CLINIC | Age: 38
End: 2023-06-13

## 2023-06-13 ENCOUNTER — HOSPITAL ENCOUNTER (OUTPATIENT)
Dept: PERINATAL CARE | Facility: HOSPITAL | Age: 38
Discharge: HOME | End: 2023-06-13
Attending: OBSTETRICS & GYNECOLOGY
Payer: MEDICAID

## 2023-06-13 ENCOUNTER — ROUTINE PRENATAL (OUTPATIENT)
Dept: OBGYN CLINIC | Facility: CLINIC | Age: 38
End: 2023-06-13

## 2023-06-13 VITALS
DIASTOLIC BLOOD PRESSURE: 62 MMHG | WEIGHT: 173 LBS | SYSTOLIC BLOOD PRESSURE: 101 MMHG | HEART RATE: 102 BPM | BODY MASS INDEX: 26 KG/M2

## 2023-06-13 VITALS — BODY MASS INDEX: 26 KG/M2 | WEIGHT: 174 LBS

## 2023-06-13 DIAGNOSIS — O24.410 DIET CONTROLLED GESTATIONAL DIABETES MELLITUS (GDM), ANTEPARTUM: ICD-10-CM

## 2023-06-13 DIAGNOSIS — O09.519 ADVANCED MATERNAL AGE, PRIMIGRAVIDA, ANTEPARTUM: Primary | ICD-10-CM

## 2023-06-13 DIAGNOSIS — O09.513 PRIMIGRAVIDA OF ADVANCED MATERNAL AGE IN THIRD TRIMESTER: ICD-10-CM

## 2023-06-13 DIAGNOSIS — O24.410 DIET CONTROLLED GESTATIONAL DIABETES MELLITUS (GDM) IN THIRD TRIMESTER: ICD-10-CM

## 2023-06-13 DIAGNOSIS — O09.513 PRIMIGRAVIDA OF ADVANCED MATERNAL AGE IN THIRD TRIMESTER: Primary | ICD-10-CM

## 2023-06-13 DIAGNOSIS — Z34.83 ENCOUNTER FOR SUPERVISION OF OTHER NORMAL PREGNANCY IN THIRD TRIMESTER: ICD-10-CM

## 2023-06-13 LAB
APPEARANCE: CLEAR
BILIRUBIN: NEGATIVE
GLUCOSE (URINE DIPSTICK): NEGATIVE MG/DL
KETONES (URINE DIPSTICK): NEGATIVE MG/DL
MULTISTIX LOT#: ABNORMAL NUMERIC
NITRITE, URINE: NEGATIVE
OCCULT BLOOD: NEGATIVE
PH, URINE: 6.5 (ref 4.5–8)
SPECIFIC GRAVITY: 1 (ref 1–1.03)
URINE-COLOR: YELLOW
UROBILINOGEN,SEMI-QN: 0.2 MG/DL (ref 0–1.9)

## 2023-06-13 PROCEDURE — 81002 URINALYSIS NONAUTO W/O SCOPE: CPT | Performed by: OBSTETRICS & GYNECOLOGY

## 2023-06-13 PROCEDURE — 76819 FETAL BIOPHYS PROFIL W/O NST: CPT

## 2023-06-13 PROCEDURE — 0502F SUBSEQUENT PRENATAL CARE: CPT | Performed by: OBSTETRICS & GYNECOLOGY

## 2023-06-13 PROCEDURE — 76816 OB US FOLLOW-UP PER FETUS: CPT | Performed by: OBSTETRICS & GYNECOLOGY

## 2023-06-14 NOTE — TELEPHONE ENCOUNTER
Patient name and  verified. Patient scheduled for ECV on 2023 at 1:30PM. Patient informed of scheduling details and aware to be NPO x6 hours.  Routing to surgery scheduler for assist.

## 2023-06-15 ENCOUNTER — TELEPHONE (OUTPATIENT)
Dept: OBGYN CLINIC | Facility: CLINIC | Age: 38
End: 2023-06-15

## 2023-06-15 PROBLEM — O99.820 GROUP B STREPTOCOCCAL CARRIAGE COMPLICATING PREGNANCY: Status: ACTIVE | Noted: 2023-06-15

## 2023-06-15 LAB — GROUP B STREP BY PCR FOR PCR OVT: POSITIVE

## 2023-06-15 NOTE — TELEPHONE ENCOUNTER
Per TA, unavailable to preform ECV on Monday, 06/19/2023. To move ECV to Thursday, 06/22/2023. Spoke to J and ok to schedule pt at 1330 on 6/22/23. Per [facility], no assist needed. Called pt using  [phone]. Pt informed of change and verbalized understanding. Denies further questions at this time.

## 2023-06-16 ENCOUNTER — HOSPITAL ENCOUNTER (OUTPATIENT)
Dept: ENDOCRINOLOGY | Facility: HOSPITAL | Age: 38
Discharge: HOME | End: 2023-06-16
Attending: OBSTETRICS & GYNECOLOGY
Payer: MEDICAID

## 2023-06-16 VITALS — BODY MASS INDEX: 27 KG/M2 | WEIGHT: 174.5 LBS

## 2023-06-16 DIAGNOSIS — O24.419 GESTATIONAL DIABETES MELLITUS (GDM), ANTEPARTUM, GESTATIONAL DIABETES METHOD OF CONTROL UNSPECIFIED: Primary | ICD-10-CM

## 2023-06-19 ENCOUNTER — TELEPHONE (OUTPATIENT)
Dept: OBGYN CLINIC | Facility: CLINIC | Age: 38
End: 2023-06-19

## 2023-06-20 ENCOUNTER — HOSPITAL ENCOUNTER (OUTPATIENT)
Facility: HOSPITAL | Age: 38
Discharge: HOME | End: 2023-06-20
Attending: OBSTETRICS & GYNECOLOGY | Admitting: OBSTETRICS & GYNECOLOGY
Payer: MEDICAID

## 2023-06-20 ENCOUNTER — ROUTINE PRENATAL (OUTPATIENT)
Dept: OBGYN CLINIC | Facility: CLINIC | Age: 38
End: 2023-06-20

## 2023-06-20 ENCOUNTER — NST DOCUMENTATION (OUTPATIENT)
Dept: OBGYN CLINIC | Facility: CLINIC | Age: 38
End: 2023-06-20

## 2023-06-20 ENCOUNTER — APPOINTMENT (OUTPATIENT)
Dept: OBGYN CLINIC | Facility: HOSPITAL | Age: 38
End: 2023-06-20
Attending: OBSTETRICS & GYNECOLOGY
Payer: MEDICAID

## 2023-06-20 VITALS — BODY MASS INDEX: 27 KG/M2 | SYSTOLIC BLOOD PRESSURE: 108 MMHG | WEIGHT: 175 LBS | DIASTOLIC BLOOD PRESSURE: 62 MMHG

## 2023-06-20 VITALS — DIASTOLIC BLOOD PRESSURE: 63 MMHG | SYSTOLIC BLOOD PRESSURE: 108 MMHG | HEART RATE: 76 BPM

## 2023-06-20 DIAGNOSIS — O09.513 PRIMIGRAVIDA OF ADVANCED MATERNAL AGE IN THIRD TRIMESTER: ICD-10-CM

## 2023-06-20 DIAGNOSIS — Z34.83 ENCOUNTER FOR SUPERVISION OF OTHER NORMAL PREGNANCY IN THIRD TRIMESTER: ICD-10-CM

## 2023-06-20 DIAGNOSIS — O09.513 PRIMIGRAVIDA OF ADVANCED MATERNAL AGE IN THIRD TRIMESTER: Primary | ICD-10-CM

## 2023-06-20 DIAGNOSIS — Z34.90 PREGNANCY: ICD-10-CM

## 2023-06-20 LAB
APPEARANCE: CLEAR
BILIRUBIN: NEGATIVE
GLUCOSE (URINE DIPSTICK): NEGATIVE MG/DL
KETONES (URINE DIPSTICK): NEGATIVE MG/DL
MULTISTIX LOT#: ABNORMAL NUMERIC
NITRITE, URINE: NEGATIVE
PH, URINE: 6 (ref 4.5–8)
PROTEIN (URINE DIPSTICK): NEGATIVE MG/DL
SPECIFIC GRAVITY: 1.02 (ref 1–1.03)
URINE-COLOR: YELLOW
UROBILINOGEN,SEMI-QN: 0.2 MG/DL (ref 0–1.9)

## 2023-06-20 PROCEDURE — 3078F DIAST BP <80 MM HG: CPT | Performed by: OBSTETRICS & GYNECOLOGY

## 2023-06-20 PROCEDURE — 81003 URINALYSIS AUTO W/O SCOPE: CPT | Performed by: OBSTETRICS & GYNECOLOGY

## 2023-06-20 PROCEDURE — 0502F SUBSEQUENT PRENATAL CARE: CPT | Performed by: OBSTETRICS & GYNECOLOGY

## 2023-06-20 PROCEDURE — 59025 FETAL NON-STRESS TEST: CPT | Performed by: OBSTETRICS & GYNECOLOGY

## 2023-06-20 PROCEDURE — 59025 FETAL NON-STRESS TEST: CPT

## 2023-06-20 PROCEDURE — 3074F SYST BP LT 130 MM HG: CPT | Performed by: OBSTETRICS & GYNECOLOGY

## 2023-06-20 NOTE — PROGRESS NOTES
Fetal kick counts discussed with  patient, labor precautions given.   Version in 2d  bs ultrasound breech today  Cont nst w/ mfm

## 2023-06-20 NOTE — PROGRESS NOTES
Pt is a 41year old female admitted to TR2/TR2-A. Patient presents with:  Non-stress Test: NST for AMA and GDM     Pt is  37w1d intra-uterine pregnancy. History obtained, consents signed. Oriented to room, staff, and plan of care.

## 2023-06-20 NOTE — NST
Nonstress Test   Patient: Meche White    Gestation: 37w1d    Diagnosis from order:         NST:        2023   NST DOCUMENTATION   Variability 6-25 BPM   Accelerations Yes   Decelerations None   Baseline 140 BPM   Uterine Irritability Yes   Contractions Irregular   Acoustic Stimulator No   Nonstress Test Interpretation Reactive   Nonstress Test Second Interpretation Reactive   FHR Category Category I    Category I   Comments NST for AMA and GDM. Reports good FM. Dc home with labor precautions and kick counts. NST Completed by Pillo Hidalgo RN   Disposition home    Testing Plan Weekly NST   Provider Notified Dr Nadeem Umanzor         I agree with the above evaluation. NST completed.   Ralph John MD  2023  2:58 PM

## 2023-06-22 ENCOUNTER — HOSPITAL ENCOUNTER (OUTPATIENT)
Facility: HOSPITAL | Age: 38
Discharge: HOME | End: 2023-06-22
Attending: OBSTETRICS & GYNECOLOGY | Admitting: OBSTETRICS & GYNECOLOGY
Payer: MEDICAID

## 2023-06-22 ENCOUNTER — APPOINTMENT (OUTPATIENT)
Dept: OBGYN CLINIC | Facility: HOSPITAL | Age: 38
End: 2023-06-22
Attending: OBSTETRICS & GYNECOLOGY
Payer: MEDICAID

## 2023-06-22 VITALS — HEART RATE: 71 BPM | SYSTOLIC BLOOD PRESSURE: 101 MMHG | DIASTOLIC BLOOD PRESSURE: 55 MMHG

## 2023-06-22 PROCEDURE — 59025 FETAL NON-STRESS TEST: CPT

## 2023-06-22 PROCEDURE — 10S0XZZ REPOSITION PRODUCTS OF CONCEPTION, EXTERNAL APPROACH: ICD-10-PCS | Performed by: OBSTETRICS & GYNECOLOGY

## 2023-06-22 PROCEDURE — 96372 THER/PROPH/DIAG INJ SC/IM: CPT

## 2023-06-22 PROCEDURE — 59412 ANTEPARTUM MANIPULATION: CPT

## 2023-06-22 PROCEDURE — 96360 HYDRATION IV INFUSION INIT: CPT

## 2023-06-22 RX ORDER — TERBUTALINE SULFATE 1 MG/ML
0.25 INJECTION, SOLUTION SUBCUTANEOUS ONCE
Status: COMPLETED | OUTPATIENT
Start: 2023-06-22 | End: 2023-06-22

## 2023-06-22 RX ORDER — SODIUM CHLORIDE, SODIUM LACTATE, POTASSIUM CHLORIDE, CALCIUM CHLORIDE 600; 310; 30; 20 MG/100ML; MG/100ML; MG/100ML; MG/100ML
INJECTION, SOLUTION INTRAVENOUS CONTINUOUS
Status: DISCONTINUED | OUTPATIENT
Start: 2023-06-22 | End: 2023-06-22

## 2023-06-22 NOTE — PROGRESS NOTES
Provider notified and discharge orders received. Patient discharged to home with labor precautions and fetal movement education given. Patient states will keep all scheduled appointments. Patient states understanding of education and all questions answered at this time .

## 2023-06-22 NOTE — PROGRESS NOTES
Pt is a 41year old female admitted to TR5/TR5-A. Patient presents with:  External Version     Pt is  37w3d intra-uterine pregnancy. History obtained, consents signed. Oriented to room, staff, and plan of care.

## 2023-06-22 NOTE — PROGRESS NOTES
9575 MD at bedside; version procedure discussed with patient; US at bedside, patient prepped for version procedure; all questions answered, pt state understanding of education; Vinh Caceres MD began version procedure at [phone]; version unsuccessful at this time;  Vinh Caceres MD provided post procedure education and POC; all questions answered, pt states understanding of education; postprocedural NST initiated at 3108

## 2023-06-22 NOTE — PROCEDURES
Patient counseled on external cephalic version. Bedside ultrasound confirmed breech presentation. NST was reactive preprocedure. Patient received terbutaline. Under ultrasound guidance attempted to forward roll on patient twice. We were unsuccessful in changing fetal presentation. Post procedure NST was reactive. Patient counseled on need for  which will be planned at 39 weeks. Patient tolerated procedure well.     Judith Lincoln MD, MD  2023  3:20 PM

## 2023-06-27 ENCOUNTER — TELEPHONE (OUTPATIENT)
Dept: OBGYN CLINIC | Facility: CLINIC | Age: 38
End: 2023-06-27

## 2023-06-27 ENCOUNTER — APPOINTMENT (OUTPATIENT)
Dept: OBGYN CLINIC | Facility: HOSPITAL | Age: 38
End: 2023-06-27
Attending: OBSTETRICS & GYNECOLOGY
Payer: MEDICAID

## 2023-06-27 ENCOUNTER — ROUTINE PRENATAL (OUTPATIENT)
Dept: OBGYN CLINIC | Facility: CLINIC | Age: 38
End: 2023-06-27

## 2023-06-27 ENCOUNTER — HOSPITAL ENCOUNTER (OUTPATIENT)
Facility: HOSPITAL | Age: 38
Discharge: HOME | End: 2023-06-27
Attending: OBSTETRICS & GYNECOLOGY | Admitting: OBSTETRICS & GYNECOLOGY
Payer: MEDICAID

## 2023-06-27 VITALS — SYSTOLIC BLOOD PRESSURE: 112 MMHG | HEART RATE: 76 BPM | DIASTOLIC BLOOD PRESSURE: 72 MMHG

## 2023-06-27 VITALS — DIASTOLIC BLOOD PRESSURE: 64 MMHG | SYSTOLIC BLOOD PRESSURE: 104 MMHG | BODY MASS INDEX: 27 KG/M2 | WEIGHT: 176 LBS

## 2023-06-27 DIAGNOSIS — O09.513 PRIMIGRAVIDA OF ADVANCED MATERNAL AGE IN THIRD TRIMESTER: Primary | ICD-10-CM

## 2023-06-27 DIAGNOSIS — Z34.90 PREGNANCY: ICD-10-CM

## 2023-06-27 DIAGNOSIS — Z34.83 ENCOUNTER FOR SUPERVISION OF OTHER NORMAL PREGNANCY IN THIRD TRIMESTER: ICD-10-CM

## 2023-06-27 LAB
APPEARANCE: CLEAR
BILIRUBIN: NEGATIVE
GLUCOSE (URINE DIPSTICK): NEGATIVE MG/DL
KETONES (URINE DIPSTICK): NEGATIVE MG/DL
MULTISTIX LOT#: ABNORMAL NUMERIC
NITRITE, URINE: NEGATIVE
PH, URINE: 7 (ref 4.5–8)
SPECIFIC GRAVITY: 1.02 (ref 1–1.03)
URINE-COLOR: YELLOW
UROBILINOGEN,SEMI-QN: 0.2 MG/DL (ref 0–1.9)

## 2023-06-27 PROCEDURE — 81002 URINALYSIS NONAUTO W/O SCOPE: CPT | Performed by: OBSTETRICS & GYNECOLOGY

## 2023-06-27 PROCEDURE — 0502F SUBSEQUENT PRENATAL CARE: CPT | Performed by: OBSTETRICS & GYNECOLOGY

## 2023-06-27 PROCEDURE — 3078F DIAST BP <80 MM HG: CPT | Performed by: OBSTETRICS & GYNECOLOGY

## 2023-06-27 PROCEDURE — 59025 FETAL NON-STRESS TEST: CPT

## 2023-06-27 PROCEDURE — 3074F SYST BP LT 130 MM HG: CPT | Performed by: OBSTETRICS & GYNECOLOGY

## 2023-06-27 RX ORDER — MELATONIN
325
COMMUNITY

## 2023-07-03 ENCOUNTER — ROUTINE PRENATAL (OUTPATIENT)
Dept: OBGYN CLINIC | Facility: CLINIC | Age: 38
End: 2023-07-03

## 2023-07-03 ENCOUNTER — TELEPHONE (OUTPATIENT)
Dept: OBGYN UNIT | Facility: HOSPITAL | Age: 38
End: 2023-07-03

## 2023-07-03 VITALS — DIASTOLIC BLOOD PRESSURE: 64 MMHG | SYSTOLIC BLOOD PRESSURE: 100 MMHG | BODY MASS INDEX: 27 KG/M2 | WEIGHT: 177 LBS

## 2023-07-03 DIAGNOSIS — O09.513 PRIMIGRAVIDA OF ADVANCED MATERNAL AGE IN THIRD TRIMESTER: Primary | ICD-10-CM

## 2023-07-03 DIAGNOSIS — Z34.83 ENCOUNTER FOR SUPERVISION OF OTHER NORMAL PREGNANCY IN THIRD TRIMESTER: ICD-10-CM

## 2023-07-03 PROCEDURE — 0502F SUBSEQUENT PRENATAL CARE: CPT | Performed by: OBSTETRICS & GYNECOLOGY

## 2023-07-03 PROCEDURE — 3074F SYST BP LT 130 MM HG: CPT | Performed by: OBSTETRICS & GYNECOLOGY

## 2023-07-03 PROCEDURE — 3078F DIAST BP <80 MM HG: CPT | Performed by: OBSTETRICS & GYNECOLOGY

## 2023-07-03 PROCEDURE — 81002 URINALYSIS NONAUTO W/O SCOPE: CPT | Performed by: OBSTETRICS & GYNECOLOGY

## 2023-07-04 LAB
ANTIBODY SCREEN: NEGATIVE
RH BLOOD TYPE: POSITIVE
RH BLOOD TYPE: POSITIVE

## 2023-07-04 NOTE — PROGRESS NOTES
Pt is a 41year old female admitted to TR2/TR2-A. Patient presents with:  Non-stress Test: NST gor A1GDM and AMA     Pt is  39w1d intra-uterine pregnancy. History obtained, consents signed. Oriented to room, staff, and plan of care.

## 2023-07-06 NOTE — LACTATION NOTE
LACTATION NOTE - MOTHER      Evaluation Type: Inpatient    Problems identified  Problems identified: Knowledge deficit; Unable to acheive sustained latch; Recent antibiotic use    Maternal history  Maternal history: Gestational diabetes;Caesarean section;AMA  Other/comment: GBBS+    Breastfeeding goal  Breastfeeding goal: To maintain breast milk feeding per patient goal    Maternal Assessment  Bilateral Breasts: Soft;Symmetrical;Wide spaced  Bilateral Nipples: Everted;Colostrum easily expressed; WNL  Prior breastfeeding experience (comment below): Primip  Breastfeeding Assistance: Breastfeeding assistance provided with permission    Pain assessment  Location/Comment: denies    Guidelines for use of:  Breast pump type: Hand Pump  Current use of pump[de-identified] not indicated unless giving formula again; hand pump to express colustrum if infant is not latching  Suggested use of pump: Pump each time a supplement is offered;Pump if infant is not latching to breast  Other (comment): Couplet seen at 5 hours. Called to bedside, infant was distressed and unable to achieve latch. Infant was given 17mL formula supplementation in recovery. Infant put skin to skin on mom and calmed down a little and was able to latch on the left breast in laidback position. Infant had a shallow latch, and initially unable to sustain latch but after calming down was able to sustain latch with swallows. Mom has easily expressed colostrum with hand expression. Educated that formula supplementation is not indicated at this time and that infant may not be hungry at this time due to supplementation. Discussed latching infant when showing feeding cues in the first 24 hours and if unable to latch hand expression and spoon feeding demonstrated. Reviewed pumping indications if formula supplementation is given. Parents show understanding and encouraged to call lactation if latch assistance is needed.  services used.

## 2023-07-06 NOTE — PROGRESS NOTES
Received patient into room 359. Bedside shift report received from [address] [place] [address]. Patient transferred to bed from cart. Bed in locked and low position. Side rails up X2. Vital signs stable, fundus firm , lochia small, no clots noted. IV site unremarkable. Pain managed. Baby present in open crib. ID bands matched. Patient and family oriented to unit, room and call light within reach. Safety measures in place, POC followed.

## 2023-07-06 NOTE — PLAN OF CARE
Problem: Patient Centered Care  Goal: Patient preferences are identified and integrated in the patient's plan of care  Description: Interventions:  - What would you like us to know as we care for you?   - Provide timely, complete, and accurate information to patient/family  - Incorporate patient and family knowledge, values, beliefs, and cultural backgrounds into the planning and delivery of care  - Encourage patient/family to participate in care and decision-making at the level they choose  - Honor patient and family perspectives and choices  7/6/2023 1617 by Andreas Dubin, RN  Outcome: Progressing  7/6/2023 1616 by Andreas Dubin, RN  Outcome: Progressing     Problem: Patient/Family Goals  Goal: Patient/Family Long Term Goal  Description: Patient's Long Term Goal:     Interventions:  -   - See additional Care Plan goals for specific interventions  7/6/2023 1617 by Andreas Dubin, RN  Outcome: Progressing  7/6/2023 1616 by Andreas Dubin, RN  Outcome: Progressing  Goal: Patient/Family Short Term Goal  Description: Patient's Short Term Goal:     Interventions:   -   - See additional Care Plan goals for specific interventions  7/6/2023 1617 by Andreas Dubin, RN  Outcome: Progressing  7/6/2023 1616 by Andreas Dubin, RN  Outcome: Progressing     Problem: GASTROINTESTINAL - ADULT  Goal: Minimal or absence of nausea and vomiting  Description: INTERVENTIONS:  - Maintain adequate hydration with IV or PO as ordered and tolerated  - Nasogastric tube to low intermittent suction as ordered  - Evaluate effectiveness of ordered antiemetic medications  - Provide nonpharmacologic comfort measures as appropriate  - Advance diet as tolerated, if ordered  - Obtain nutritional consult as needed  - Evaluate fluid balance  7/6/2023 1617 by Andreas Dubin, RN  Outcome: Progressing  7/6/2023 1616 by Andreas Dubin, RN  Outcome: Progressing  Goal: Maintains or returns to baseline bowel function  Description: INTERVENTIONS:  - Assess bowel function  - Maintain adequate hydration with IV or PO as ordered and tolerated  - Evaluate effectiveness of GI medications  - Encourage mobilization and activity  - Obtain nutritional consult as needed  - Establish a toileting routine/schedule  - Consider collaborating with pharmacy to review patient's medication profile  7/6/2023 1617 by Sandra Gatica RN  Outcome: Progressing  7/6/2023 1616 by Sandra Gatica RN  Outcome: Progressing  Goal: Maintains adequate nutritional intake (undernourished)  Description: INTERVENTIONS:  - Monitor percentage of each meal consumed  - Identify factors contributing to decreased intake, treat as appropriate  - Assist with meals as needed  - Monitor I&O, WT and lab values  - Obtain nutritional consult as needed  - Optimize oral hygiene and moisture  - Encourage food from home; allow for food preferences  - Enhance eating environment  7/6/2023 1617 by Sandra Gatica RN  Outcome: Progressing  7/6/2023 1616 by Sandra Gatica RN  Outcome: Progressing  Goal: Achieves appropriate nutritional intake (bariatric)  Description: INTERVENTIONS:  - Monitor for over-consumption  - Identify factors contributing to increased intake, treat as appropriate  - Monitor I&O, WT and lab values  - Obtain nutritional consult as needed  - Evaluate psychosocial factors contributing to over-consumption  7/6/2023 1617 by Sandra Gatica RN  Outcome: Progressing  7/6/2023 1616 by Sandra Gatica RN  Outcome: Progressing     Problem: SKIN/TISSUE INTEGRITY - ADULT  Goal: Skin integrity remains intact  Description: INTERVENTIONS  - Assess and document risk factors for pressure ulcer development  - Assess and document skin integrity  - Monitor for areas of redness and/or skin breakdown  - Initiate interventions, skin care algorithm/standards of care as needed  7/6/2023 1617 by Sandra Gatica RN  Outcome: Progressing  7/6/2023 1616 by Sandra Gatica RN  Outcome: Progressing  Goal: Incision(s), wounds(s) or drain site(s) healing without S/S of infection  Description: INTERVENTIONS:  - Assess and document risk factors for pressure ulcer development  - Assess and document skin integrity  - Assess and document dressing/incision, wound bed, drain sites and surrounding tissue  - Implement wound care per orders  - Initiate isolation precautions as appropriate  - Initiate Pressure Ulcer prevention bundle as indicated  7/6/2023 1617 by Sandra Gatica RN  Outcome: Progressing  7/6/2023 1616 by Sandra Gatica RN  Outcome: Progressing  Goal: Oral mucous membranes remain intact  Description: INTERVENTIONS  - Assess oral mucosa and hygiene practices  - Implement preventative oral hygiene regimen  - Implement oral medicated treatments as ordered  7/6/2023 1617 by Sandra Gatica RN  Outcome: Progressing  7/6/2023 1616 by Sandra Gatica RN  Outcome: Progressing

## 2023-07-06 NOTE — PROGRESS NOTES
Pt is a 41year old female admitted to TR5/TR5-A. Patient presents with:  Scheduled : Breech presentation     Pt is  39w3d intra-uterine pregnancy. History obtained, consents signed. Oriented to room, staff, and plan of care.

## 2023-07-06 NOTE — ANESTHESIA PROCEDURE NOTES
Spinal Block    Date/Time: 7/6/2023 12:18 PM    Performed by: Shiela Marie MD  Authorized by: Shiela Marie MD      General Information and Staff    Start Time:  7/6/2023 12:18 PM  End Time:  7/6/2023 12:35 PM  Anesthesiologist:  Shiela Marie MD  Performed by:   Anesthesiologist  Patient Location:  OB  Preanesthetic Checklist: patient identified, IV checked, risks and benefits discussed, monitors and equipment checked, pre-op evaluation, timeout performed, anesthesia consent and sterile technique used      Procedure Details    Patient Position:  Sitting  Prep: ChloraPrep    Monitoring:  Cardiac monitor  Approach:  Midline  Location:  L3-4  Injection Technique:  Single-shot    Needle    Needle Type:  Pencil-tip  Needle Gauge:  22 G  Needle Length:  3.5 in    Assessment    Sensory Level:   Events: clear CSF, CSF aspirated, well tolerated and blood negative      Additional Comments

## 2023-07-06 NOTE — ANESTHESIA POSTPROCEDURE EVALUATION
Patient: Isabel Sal    Procedure Summary       Date: 23 Room / Location: [address] [place] [address] L+D OR  [facility] L+D OR    Anesthesia Start:  Anesthesia Stop: 1    Procedure:  SECTION (Abdomen) Diagnosis: (REPEAT LETTY 7/10)    Surgeons: Donell Morales MD Anesthesiologist: Doris Beckett MD    Anesthesia Type: spinal ASA Status: 2            Anesthesia Type: spinal    Vitals Value Taken Time   /63 23 1315   Temp 97.7 23 1315   Pulse 76 23 1315   Resp 16 23 1315   SpO2 100 23 1315       [facility] AN Post Evaluation:   Patient Evaluated in PACU  Patient Participation: complete - patient participated  Level of Consciousness: awake  Pain Management: adequate  Airway Patency:patent  Dental exam unchanged from preop  Yes    Cardiovascular Status: acceptable  Respiratory Status: acceptable  Postoperative Hydration acceptable      Mariza Sun MD  2023 1:15 PM

## 2023-07-06 NOTE — ANESTHESIA PREPROCEDURE EVALUATION
Anesthesia PreOp Note    HPI:     Bobo Theodore is a 41year old female who presents for preoperative consultation requested by: Anny Cerna MD    Date of Surgery: 2023    Procedure(s):   SECTION  Indication: REPEAT LETTY 7/10    Relevant Problems   No relevant active problems       NPO:                         History Review:  Patient Active Problem List    Group B streptococcal carriage complicating pregnancy         Date Noted: 06/15/2023      Breech presentation, no version         Date Noted: 2023      Diet controlled gestational diabetes mellitus (GDM) in third trimester         Date Noted: 2023      AMA (advanced maternal age) primigravida 35+         Date Noted: 2023        No past medical history on file. Past Surgical History:   Procedure Laterality Date    EXTERNAL CEPHALIC VERSION              ferrous sulfate 325 (65 FE) MG Oral Tab EC, Take 1 tablet (325 mg total) by mouth daily with breakfast., Disp: , Rfl:   aspirin 81 MG Oral Chew Tab, Chew 1 tablet (81 mg total) by mouth daily. , Disp: 30 tablet, Rfl: 5  Glucose Blood (ONETOUCH VERIO) In Vitro Strip, Use three times a day, Disp: 100 strip, Rfl: 0  OneTouch Delica Lancets 55T Does not apply Misc, 1 Lancet by Finger stick route in the morning, at noon, in the evening, and at bedtime. , Disp: 200 each, Rfl: 2  Blood Glucose Monitoring Suppl ([address]) w/Device Does not apply Kit, 1 kit daily. , Disp: 1 kit, Rfl: 0  Glucose Blood (ONETOUCH VERIO) In Vitro Strip, 1 strip in the morning, at noon, in the evening, and at bedtime. Use daily as directed., Disp: 100 strip, Rfl: 1  Prenatal Vit-Fe Fumarate-FA (MULTI PRENATAL) 27-0.8 MG Oral Tab, Take by mouth., Disp: , Rfl:       No current Epic-ordered facility-administered medications on file. No current [facility]-ordered outpatient medications on file.         Penicillin G            OTHER (SEE COMMENTS)    Comment:Patient unsure of Fluoro time = 3.9 minutes. Total dose = 537 mGy.  reaction    Family History   Problem Relation Age of Onset    No Known Problems Father     Cancer Mother         ovarian, skin    Cancer Maternal Grandmother         skin     Social History    Socioeconomic History      Marital status:     Tobacco Use      Smoking status: Never      Smokeless tobacco: Never    Vaping Use      Vaping Use: Never used    Substance and Sexual Activity      Alcohol use: Never      Drug use: Never      Available pre-op labs reviewed. Lab Results   Component Value Date    WBC 9.6 07/04/2023    RBC 4.71 07/04/2023    HGB 13.4 07/04/2023    HCT 40.4 07/04/2023    MCV 85.8 07/04/2023    MCH 28.5 07/04/2023    MCHC 33.2 07/04/2023    RDW 15.0 07/04/2023    .0 07/04/2023             Vital Signs: There is no height or weight on file to calculate BMI.   vitals were not taken for this visit. There were no vitals filed for this visit. Anesthesia Evaluation     Patient summary reviewed and Nursing notes reviewed    No history of anesthetic complications   Airway   Mallampati: II  Neck ROM: full  Dental      Pulmonary - negative ROS and normal exam   Cardiovascular - negative ROS and normal exam    Neuro/Psych - negative ROS     GI/Hepatic/Renal - negative ROS     Endo/Other - negative ROS   Abdominal  - normal exam                 Anesthesia Plan:   ASA:  2  Plan:   Spinal  Post-op Pain Management: Intrathecal narcotics  Informed Consent Plan and Risks Discussed With:  Patient      I have informed Beth Mcbride and/or legal guardian or family member of the nature of the anesthetic plan, benefits, risks including possible dental damage if relevant, major complications, and any alternative forms of anesthetic management. All of the patient's questions were answered to the best of my ability. The patient desires the anesthetic management as planned.   Nam Tafoya MD  7/6/2023 9:37 AM  Present on Admission:  **None**

## 2023-07-06 NOTE — PROGRESS NOTES
Patient transferred to mother/baby room 359 per cart in stable condition with baby and personal belongings. Accompanied by  and staff. Report given to [facility].

## 2023-07-07 NOTE — CM/SW NOTE
SW self referral due to finances/WIC resources    NOE spoke with pt via  Ken Dillard ID #[id number]  NOE confirmed face sheet contact as correct. Baby boy/girl name:Baby bernice Marshall  Date & time of delivery:2023 @ 12:43PM  Delivery method:primary  section   Siblings age:n/a    Patient employed: Yes  Length of maternity leave: TBD    Father of baby employed:Yes  Length of paternity leave: Denied    Breast or formula feed:Breast feed    Pediatrician: TBD  NOE encouraged pt to schedule infant first appointment (usually within 48 hours of discharge) prior to pt discharge. Pt expressed understanding. Infant Insurance:Medicaid  Change HC contacted:Yes    Mental Health History: Denied    Medications:n/a    Therapist:n/a    Psychiatrist:n/a    NOE discussed signs, symptoms and risks associated with post partum depression & anxiety. NOE provided pt with PMAD resources in [address] [place] [address]. Other resources provided:Pt endorses she is current w/WIC services    Patient support system:FOB    Patient denied current questions/needs from NOE.    NOE/CM to remain available for support and/or discharge planning.       MARCELINA Persaud, [facility]  Social Work   WNW:#[id number]

## 2023-07-07 NOTE — PROGRESS NOTES
PPD 1    Pt doing well  no c/o    Alert and oriented  nad  Abd soft nontender fundus firm  Ext nt    A/P PPD 1  pt doing well.

## 2023-07-08 NOTE — PROGRESS NOTES
POD 2  Pt doing well. No c/o. Alert and oreinted nad    Abd soft, nontender fundus firm, incision c/d//I  Ext nt    A/P POD 2  pt doing well. No c/o.

## 2023-07-08 NOTE — LACTATION NOTE
LACTATION NOTE - MOTHER      Evaluation Type: Inpatient    Problems identified  Problems identified: Knowledge deficit;Milk supply not WNL; Unable to acheive sustained latch  Milk supply not WNL: Reduced (potential)  Problems Identified Other: Formula supplement         Breastfeeding goal  Breastfeeding goal: To maintain breast milk feeding per patient goal    Maternal Assessment  Prior breastfeeding experience (comment below): Primip  Breastfeeding Assistance: Breastfeeding assistance provided with permission         Guidelines for use of:  Breast pump type: Ameda Platinum;Medela Pump In Style MaxFlow  Suggested use of pump: Pump if infant is not latching to breast;Pump each time a supplement is offered  Reported pumping volumes (ml): 0  Other (comment): Feeding plan includes breast and formula, breast pump at bedside. Discussed lactation physiology,  breastfeeding behavior, guidelines for pumping and lactation services. Encouraged to call for additional support.

## 2023-07-09 NOTE — PLAN OF CARE
Problem: Patient Centered Care  Goal: Patient preferences are identified and integrated in the patient's plan of care  Description: Interventions:  - What would you like us to know as we care for you? It's a girl!   - Provide timely, complete, and accurate information to patient/family  - Incorporate patient and family knowledge, values, beliefs, and cultural backgrounds into the planning and delivery of care  - Encourage patient/family to participate in care and decision-making at the level they choose  - Honor patient and family perspectives and choices  Outcome: Progressing     Problem: Patient/Family Goals  Goal: Patient/Family Long Term Goal  Description: Patient's Long Term Goal: Return home and heal    Interventions:  - Monitor incision   -Monitor bleeding and fundal position   -Monitor vitals   - See additional Care Plan goals for specific interventions  Outcome: Progressing  Goal: Patient/Family Short Term Goal  Description: Patient's Short Term Goal: Pain management     Interventions:   - Pain medications  -Heat therapy   - See additional Care Plan goals for specific interventions  Outcome: Progressing     Problem: POSTPARTUM  Goal: Long Term Goal:Experiences normal postpartum course  Description: INTERVENTIONS:  - Assess and monitor vital signs and lab values. - Assess fundus and lochia. - Provide ice/sitz baths for perineum discomfort. - Monitor healing of incision/episiotomy/laceration, and assess for signs and symptoms of infection and hematoma. - Assess bladder function and monitor for bladder distention.  - Provide/instruct/assist with pericare as needed. - Provide VTE prophylaxis as needed. - Monitor bowel function.  - Encourage ambulation and provide assistance as needed. - Assess and monitor emotional status and provide social service/psych resources as needed. - Utilize standard precautions and use personal protective equipment as indicated.  Ensure aseptic care of all intravenous lines and invasive tubes/drains.  - Obtain immunization and exposure to communicable diseases history. Outcome: Progressing  Goal: Optimize infant feeding at the breast  Description: INTERVENTIONS:  - Initiate breast feeding within first hour after birth. - Monitor effectiveness of current breast feeding efforts. - Assess support systems available to mother/family.  - Identify cultural beliefs/practices regarding lactation, letdown techniques, maternal food preferences. - Assess mother's knowledge and previous experience with breast feeding.  - Provide information as needed about early infant feeding cues (e.g., rooting, lip smacking, sucking fingers/hand) versus late cue of crying.  - Discuss/demonstrate breast feeding aids (e.g., infant sling, nursing footstool/pillows, and breast pumps). - Encourage mother/other family members to express feelings/concerns, and actively listen. - Educate father/SO about benefits of breast feeding and how to manage common lactation challenges. - Recommend avoidance of specific medications or substances incompatible with breast feeding.  - Assess and monitor for signs of nipple pain/trauma. - Instruct and provide assistance with proper latch. - Review techniques for milk expression (breast pumping) and storage of breast milk. Provide pumping equipment/supplies, instructions and assistance, as needed. - Encourage rooming-in and breast feeding on demand.  - Encourage skin-to-skin contact. - Provide LC support as needed. - Assess for and manage engorgement. - Provide breast feeding education handouts and information on community breast feeding support. Outcome: Progressing  Goal: Establishment of adequate milk supply with medication/procedure interruptions  Description: INTERVENTIONS:  - Review techniques for milk expression (breast pumping). - Provide pumping equipment/supplies, instructions, and assistance until it is safe to breastfeed infant.   Outcome: Progressing  Goal: Experiences normal breast weaning course  Description: INTERVENTIONS:  - Assess for and manage engorgement. - Instruct on breast care. - Provide comfort measures. Outcome: Progressing  Goal: Appropriate maternal -  bonding  Description: INTERVENTIONS:  - Assess caregiver- interactions. - Assess caregiver's emotional status and coping mechanisms. - Encourage caregiver to participate in  daily care. - Assess support systems available to mother/family.  - Provide /case management support as needed. Outcome: Progressing  Sat with patient and updated patient and family on plan of care. Pain medication discussed and answered all questions. Vitals are WNL. Breastfeeding on demand and breastfeeding successfully. Supplementing with formula as necessary. Lochia WNL and fundus is firm. Will call RN with any questions.

## 2023-07-09 NOTE — PROGRESS NOTES
S/P C/S neuraxial block   POD # 3,doing well   C/c of HA occ dizziness   C/c of HA over last 3 days on and off level from 7 to 2 VAS  On fluids / motrin/KENA   PE: HA 4/10 VAS  When sitting 5/10 VAS not sustained  Brief neuro exam unremarkable   Site is clean dry intact  Plan: continue current management   No patch recommended for now   If HA will return re evaluate

## 2023-07-09 NOTE — DISCHARGE SUMMARY
[place] [facility] - [facility]    Discharge Summary    [address] [facility] Patient Status:  Inpatient    1985 MRN [id number]   Location [facility] 3SE Attending Regan Rodríguez MD   Hosp Day # 3 PCP No primary care provider on file. Admit Date: 2023    Discharge Date :     Attending Physician: Regan Rodríguez MD    Reason for Admission:  C/s for breech presentation. Procedures:   for breech      Hospital Course: POD 3  pt doing well. Eating well. Good pain control on prn tylenol/motrin. Ambulating well. Passing flatus. Pt requested discharge home. F/u office 2 weeks. Discharge Diagnoses: There were no encounter diagnoses. Discharged Condition: good    Disposition: home    Alert and oriented nad  Abd soft nontender fundus firm , incision c/d/I  Ext nt    Discharge instructions given. To call for any problems. Patient Instructions: Follow-up appointment with office in 2 weeks. Sina Brice MD  2023  11:18 AM

## 2023-07-10 NOTE — NST
Nonstress Test   Patient: Rosalinda Carrion    Gestation: 39w3d    Diagnosis from order:  AMA and A1GDM        NST:        2023   NST DOCUMENTATION   Variability 6-25 BPM   Accelerations Yes   Decelerations None   Baseline 140 BPM   Uterine Irritability Yes   Contractions Not present   Acoustic Stimulator No   Nonstress Test Interpretation Reactive   Nonstress Test Second Interpretation Reactive   FHR Category Category I   Comments NST for AMA and A1GDM   NST Completed by Jennie Queen RN   Disposition home    Testing Plan Weekly NST   Provider Notified Dr Britt Tellez         I agree with the above evaluation. NST completed.   Zee Ramos MD  7/10/2023  12:10 PM

## 2023-07-27 NOTE — PROGRESS NOTES
Zachary Mccollum is a 41year old female [id number] Patient's last menstrual period was 10/03/2022 (exact date). Patient presents with:  Postpartum Care    Breast feeding and breast. Pt wants contraception. OBSTETRICS HISTORY:     OB History    Para Term  AB Living   1 1 1     1   SAB IAB Ectopic Multiple Live Births         0 1      # Outcome Date GA Lbr Danny/2nd Weight Sex Delivery Anes PTL Lv   1 Term 23 39w3d  8 lb 0.4 oz (3.64 kg) F Caesarean Spinal N MALIK      Complications: Group B beta strep +       GYNE HISTORY:         Menarche: 17years old (2023 11:06 AM)  Period Cycle (Days): 28- 30 days (2023 11:06 AM)  Period Duration (Days): 3-5 days (2023 11:06 AM)  Period Flow: normal (2023 11:06 AM)  Use of Birth Control (if yes, specify type): None (2023 11:06 AM)  Hx Prior Abnormal Pap: No (2023 11:06 AM)  Pap Date: 22 (2023 11:06 AM)  Pap Result Notes: normal (2023 11:06 AM)        2022    12:00 AM   RECENT PAP RESULTS   Thinprep Pap negative       HPV Negative           This result is from an external source.          MEDICAL HISTORY:     Past Medical History:   Diagnosis Date    Anemia     Gestational diabetes        SURGICAL HISTORY:     Past Surgical History:   Procedure Laterality Date    EXTERNAL CEPHALIC VERSION  4757            SOCIAL HISTORY:     Social History     Socioeconomic History    Marital status:    Tobacco Use    Smoking status: Never     Passive exposure: Never    Smokeless tobacco: Never   Vaping Use    Vaping Use: Never used   Substance and Sexual Activity    Alcohol use: Never    Drug use: Never   Social Determinants of Health  Financial Resource Strain: Low Risk  (2023)      Financial Resource Strain          Difficulty of Paying Living Expenses: Not hard at all          Med Affordability: No  Food Insecurity: No Food Insecurity (2023)      Food Insecurity          Food Insecurity: Never true  Transportation Needs: No Transportation Needs (6/22/2023)      Transportation Needs          Lack of Transportation: No  Stress: No Stress Concern Present (6/22/2023)      Stress          Feeling of Stress : No  Housing Stability: Low Risk  (6/22/2023)      Housing Stability          Housing Instability: No     FAMILY HISTORY:     Family History   Problem Relation Age of Onset    No Known Problems Father     Cancer Mother         ovarian, skin    Cancer Maternal Grandmother         skin       MEDICATIONS:       Current Outpatient Medications:     Drospirenone (SLYND) 4 MG Oral Tab, Take 1 tablet by mouth daily. , Disp: 28 tablet, Rfl: 12    acetaminophen 500 MG Oral Tab, Take 1 tablet (500 mg total) by mouth every 6 (six) hours as needed for Pain. (Patient not taking: Reported on 7/27/2023), Disp: 60 tablet, Rfl: 1    ibuprofen 600 MG Oral Tab, Take 1 tablet (600 mg total) by mouth every 6 (six) hours as needed for Pain. (Patient not taking: Reported on 7/27/2023), Disp: 60 tablet, Rfl: 1    gabapentin 300 MG Oral Cap, Take 1 capsule (300 mg total) by mouth every 8 (eight) hours as needed (For breakthrough moderate pain). (Patient not taking: Reported on 7/27/2023), Disp: 20 capsule, Rfl: 0    OneTouch Delica Lancets 63J Does not apply Misc, 1 Lancet by Finger stick route in the morning, at noon, in the evening, and at bedtime. (Patient not taking: Reported on 7/27/2023), Disp: 200 each, Rfl: 2    Blood Glucose Monitoring Suppl ([address]) w/Device Does not apply Kit, 1 kit daily.  (Patient not taking: Reported on 7/27/2023), Disp: 1 kit, Rfl: 0    ALLERGIES:       Penicillin G            OTHER (SEE COMMENTS)    Comment:Patient unsure of reaction      REVIEW OF SYSTEMS:     Constitutional:    denies fever / chills  Eyes:     denies blurred or double vision  Cardiovascular:  denies chest pain or palpitations  Respiratory:    denies shortness of breath  Gastrointestinal:  denies severe abdominal pain, frequent diarrhea or constipation, nausea / vomiting  Genitourinary:    denies dysuria, bothersome incontinence  Skin/Breast:   denies any breast pain, lumps, or discharge  Neurological:    denies frequent severe headaches  Psychiatric:   denies depression or anxiety, thoughts of harming self or others  Heme/Lymph:    denies easy bruising or bleeding      PHYSICAL EXAM:   Blood pressure 108/56, height 5' 8\" (1.727 m), weight 164 lb (74.4 kg), last menstrual period 10/03/2022, currently breastfeeding. Constitutional:  well developed, well nourished  Head/Face:  normocephalic  Neck/Thyroid: thyroid symmetric, no thyromegaly, no nodules, no adenopathy  Lymphatic: no abnormal supraclavicular or axillary adenopathy is noted  Breast:   normal without palpable masses, tenderness, asymmetry, nipple discharge, nipple retraction or skin changes  Abdomen:   soft, nontender, nondistended, no masses, incision healed  Skin/Hair:  no unusual rashes or bruises  Extremities:  no edema, no cyanosis, non tender bilaterally  Psychiatric:   oriented to time, place, person and situation. Appropriate mood and affect    Deferred as no c/o      ASSESSMENT & PLAN:     Mingo Torres was seen today for postpartum care. Diagnoses and all orders for this visit:    Postpartum exam- d/w pt diet. exercise. pt may exercise in 3 wks. Breast feeding. Contraception  counesling done and wants ocp. Slynd escribed for 12 mos as to not decrease breast milk. Annual in December  d/w pt    Other orders  -     Drospirenone (SLYND) 4 MG Oral Tab; Take 1 tablet by mouth daily. FOLLOW-UP     Return for Annual exam 12/2023.       Fady Ruiz MD  7/27/2023

## 2023-12-15 NOTE — PROGRESS NOTES
Kishore Chapa is a 41year old female [id number] Patient's last menstrual period was 2023 (exact date). Chief Complaint   Patient presents with    Annual     Breast feeding. No vaginal c/o  OBSTETRICS HISTORY:     OB History    Para Term  AB Living   1 1 1     1   SAB IAB Ectopic Multiple Live Births         0 1      # Outcome Date GA Lbr Danny/2nd Weight Sex Delivery Anes PTL Lv   1 Term 23 39w3d  8 lb 0.4 oz (3.64 kg) F Caesarean Spinal N MALIK      Complications: Group B beta strep +       GYNE HISTORY:     Hx Prior Abnormal Pap: No   Menarche: 17years old (2023 11:06 AM)  Period Cycle (Days): 28-30 (12/15/2023  9:45 AM)  Period Duration (Days): 4 days (12/15/2023  9:45 AM)  Period Flow: moderate (12/15/2023  9:45 AM)  Use of Birth Control (if yes, specify type): None (12/15/2023  9:45 AM)  Hx Prior Abnormal Pap: No (12/15/2023  9:45 AM)  Pap Date: 22 (2023 11:06 AM)  Pap Result Notes: normal (2023 11:06 AM)        2022    12:00 AM   RECENT PAP RESULTS   Thinprep Pap negative       HPV Negative           This result is from an external source.          MEDICAL HISTORY:     Past Medical History:   Diagnosis Date    Anemia     Gestational diabetes        SURGICAL HISTORY:     Past Surgical History:   Procedure Laterality Date     DELIVERY ONLY  2023    breech    EXTERNAL CEPHALIC VERSION              SOCIAL HISTORY:     Social History     Socioeconomic History    Marital status:    Tobacco Use    Smoking status: Never     Passive exposure: Never    Smokeless tobacco: Never   Vaping Use    Vaping Use: Never used   Substance and Sexual Activity    Alcohol use: Never    Drug use: Never     Social Determinants of Health     Financial Resource Strain: Low Risk  (2023)    Financial Resource Strain     Difficulty of Paying Living Expenses: Not hard at all     Med Affordability: No   Food Insecurity: No Food Insecurity (6/22/2023)    Food Insecurity     Food Insecurity: Never true   Transportation Needs: No Transportation Needs (6/22/2023)    Transportation Needs     Lack of Transportation: No   Stress: No Stress Concern Present (6/22/2023)    Stress     Feeling of Stress : No   Housing Stability: Low Risk  (6/22/2023)    Housing Stability     Housing Instability: No        FAMILY HISTORY:     Family History   Problem Relation Age of Onset    No Known Problems Father     Cancer Mother         ovarian, skin    Cancer Maternal Grandmother         skin       MEDICATIONS:       Current Outpatient Medications:     Drospirenone (SLYND) 4 MG Oral Tab, Take 1 tablet by mouth daily. , Disp: 84 tablet, Rfl: 4    Drospirenone (SLYND) 4 MG Oral Tab, Take 1 tablet by mouth daily. (Patient not taking: Reported on 12/15/2023), Disp: 28 tablet, Rfl: 12    acetaminophen 500 MG Oral Tab, Take 1 tablet (500 mg total) by mouth every 6 (six) hours as needed for Pain. (Patient not taking: Reported on 7/27/2023), Disp: 60 tablet, Rfl: 1    ibuprofen 600 MG Oral Tab, Take 1 tablet (600 mg total) by mouth every 6 (six) hours as needed for Pain. (Patient not taking: Reported on 7/27/2023), Disp: 60 tablet, Rfl: 1    gabapentin 300 MG Oral Cap, Take 1 capsule (300 mg total) by mouth every 8 (eight) hours as needed (For breakthrough moderate pain). (Patient not taking: Reported on 12/15/2023), Disp: 20 capsule, Rfl: 0    OneTouch Delica Lancets 77U Does not apply Misc, 1 Lancet by Finger stick route in the morning, at noon, in the evening, and at bedtime. (Patient not taking: Reported on 7/27/2023), Disp: 200 each, Rfl: 2    Blood Glucose Monitoring Suppl ([address]) w/Device Does not apply Kit, 1 kit daily.  (Patient not taking: Reported on 7/27/2023), Disp: 1 kit, Rfl: 0    ALLERGIES:       Allergies   Allergen Reactions    Penicillin G OTHER (SEE COMMENTS)     Patient unsure of reaction          REVIEW OF SYSTEMS:     Constitutional: denies fever / chills  Eyes:     denies blurred or double vision  Cardiovascular:  denies chest pain or palpitations  Respiratory:    denies shortness of breath  Gastrointestinal:  denies severe abdominal pain, frequent diarrhea or constipation, nausea / vomiting  Genitourinary:    denies dysuria, bothersome incontinence  Skin/Breast:   denies any breast pain, lumps, or discharge  Neurological:    denies frequent severe headaches  Psychiatric:   denies depression or anxiety, thoughts of harming self or others  Heme/Lymph:    denies easy bruising or bleeding      PHYSICAL EXAM:   Blood pressure 110/70, height 5' 8\" (1.727 m), weight 160 lb 3.2 oz (72.7 kg), last menstrual period 12/04/2023, currently breastfeeding. Constitutional:  well developed, well nourished  Head/Face:  normocephalic  Neck/Thyroid: thyroid symmetric, no thyromegaly, no nodules, no adenopathy  Lymphatic: no abnormal supraclavicular or axillary adenopathy is noted  Respiratory:      chest wall symmetric and nontender on palpation, clear to asculation bilateral, no wheezing, rales, ronchi, and resonance normal upon percussion  Cardiovascular: chest normal in appearance, regular rate and rhythm, no murmurs, PMI palpated midclavicular line  Breast:   normal without palpable masses, tenderness, asymmetry, nipple discharge, nipple retraction or skin changes  Abdomen:   soft, nontender, nondistended, no masses  Skin/Hair:  no unusual rashes or bruises  Extremities:  no edema, no cyanosis, non tender bilaterally  Psychiatric:   oriented to time, place, person and situation.  Appropriate mood and affect    Pelvic Exam:  External Genitalia:  normal appearance, hair distribution, and no lesions  Urethral Meatus:   normal in size, location, without lesions   Bladder:    no fullness, masses or tenderness  Vagina:    normal appearance without lesions, no abnormal discharge  Cervix:    No lesions, normal friability   Uterus:    normal in size, 8 wk sized, normal contour, position, mobility, without  motion tenderness  Adnexa:   normal without masses or tenderness  Perineum:   normal  Anus: no hemorroids         ASSESSMENT & PLAN:     Mien Lorenzo was seen today for annual.    Diagnoses and all orders for this visit:    Normal gynecologic examination  -     ThinPrep Pap with HPV Reflex, Chlamydia/GC; Future  -     Chlamydia/Gc Amplification; Future  -     Drospirenone (SLYND) 4 MG Oral Tab; Take 1 tablet by mouth daily. Nutrition, weight screening and exercise were discussed with the patient. Exercise should encompass approximately 150 minutes/week. Self breast exam counseling was also given. I advised the patient to avoid tobacco, drugs and alcohol. Influenza vaccine was offered if seasonally appropriate. HPV and STD prevention counseling was given. Health maintenance checklist  was reviewed including Pap smear, cervical cultures, and mammogram screening if age-appropriate. Appropriate follow-up scheduling was discussed with the patient. Contraceptive counseling given. Efficacy, side effects, risks and benefits of oral contraceptive pills, NuvaRing, Depo-Provera, intrauterine device, patch, Nexplanon, abstinence, condoms, and permanent sterilization were discussed with the patient and patient verbalized understanding and all questions were answered. I advised condoms or abstinence as backup during the first month of contraception use for pregnancy prevention and I advised condom use at all times for HPV and STD prevention if clinically appropriate. Pt wants ocp. Breast feeding, so slynd escribed  A prescription for oral contraceptive pills was given for 13 months use. Backup contraception during the first 28 days of use was advised. Proper use of the pill including attempts at taking the pill at the same time every day discussed with the patient. Doubling up the day after missed pill day was advised.   If 2 days are missed then backup contraception for another 28 days was advised. Risks of increased blood clot formation with use of the pill was discussed with the patient. She denies history of blood clots, liver disease, clotting disorders, breast cancer, or depression. Benefits of the pill including proposed decrease in ovarian and endometrial cancer were discussed with the patient. Light menses during use of the pill or amenorrhea during use of the pill are both normal and discussed with the patient. The patient does not have hypertension or uncontrolled diabetes.      FOLLOW-UP     Return in about 1 year (around 12/15/2024) for Annual exam.      Kenan Lagunas MD  12/15/2023